# Patient Record
Sex: FEMALE | URBAN - METROPOLITAN AREA
[De-identification: names, ages, dates, MRNs, and addresses within clinical notes are randomized per-mention and may not be internally consistent; named-entity substitution may affect disease eponyms.]

---

## 2019-10-15 ENCOUNTER — APPOINTMENT (OUTPATIENT)
Age: 34
Setting detail: DERMATOLOGY
End: 2019-10-15

## 2019-10-15 DIAGNOSIS — L20.89 OTHER ATOPIC DERMATITIS: ICD-10-CM

## 2019-10-15 PROCEDURE — OTHER PRESCRIPTION MEDICATION MANAGEMENT: OTHER

## 2019-10-15 PROCEDURE — OTHER COUNSELING: OTHER

## 2019-10-15 PROCEDURE — OTHER PRESCRIPTION: OTHER

## 2019-10-15 PROCEDURE — 99202 OFFICE O/P NEW SF 15 MIN: CPT

## 2019-10-15 RX ORDER — MOMETASONE FUROATE 1 MG/G
OINTMENT TOPICAL BID
Qty: 45 | Refills: 1 | Status: ERX | COMMUNITY
Start: 2019-10-15

## 2019-10-15 ASSESSMENT — LOCATION DETAILED DESCRIPTION DERM
LOCATION DETAILED: RIGHT ULNAR DORSAL HAND
LOCATION DETAILED: RIGHT INFERIOR ANTERIOR NECK
LOCATION DETAILED: LEFT RADIAL DORSAL HAND
LOCATION DETAILED: LEFT PROXIMAL PRETIBIAL REGION
LOCATION DETAILED: RIGHT PROXIMAL PRETIBIAL REGION
LOCATION DETAILED: SUPERIOR THORACIC SPINE

## 2019-10-15 ASSESSMENT — LOCATION SIMPLE DESCRIPTION DERM
LOCATION SIMPLE: RIGHT HAND
LOCATION SIMPLE: LEFT HAND
LOCATION SIMPLE: RIGHT ANTERIOR NECK
LOCATION SIMPLE: LEFT PRETIBIAL REGION
LOCATION SIMPLE: RIGHT PRETIBIAL REGION
LOCATION SIMPLE: UPPER BACK

## 2019-10-15 ASSESSMENT — LOCATION ZONE DERM
LOCATION ZONE: HAND
LOCATION ZONE: TRUNK
LOCATION ZONE: LEG
LOCATION ZONE: NECK

## 2019-10-15 NOTE — HPI: RASH
How Severe Is Your Rash?: moderate
Is This A New Presentation, Or A Follow-Up?: Rash
Additional History: Patient says the rash started on her leg a couple months ago and spread to other parts of her body. Patient used Triamcinolone that she had from two years ago. She said it was helpful. The patient said this occurred before two years ago when she was pregnant. Now that she is pregnant again, it has recurred. She said it was diagnosed as some sort of bacterial infection the last time.

## 2019-11-12 ENCOUNTER — APPOINTMENT (OUTPATIENT)
Age: 34
Setting detail: DERMATOLOGY
End: 2019-11-12

## 2019-11-12 DIAGNOSIS — L20.89 OTHER ATOPIC DERMATITIS: ICD-10-CM

## 2019-11-12 PROCEDURE — OTHER PRESCRIPTION: OTHER

## 2019-11-12 PROCEDURE — OTHER COUNSELING: OTHER

## 2019-11-12 PROCEDURE — OTHER PRESCRIPTION MEDICATION MANAGEMENT: OTHER

## 2019-11-12 PROCEDURE — 99213 OFFICE O/P EST LOW 20 MIN: CPT

## 2019-11-12 RX ORDER — CRISABOROLE 20 MG/G
OINTMENT TOPICAL BID
Qty: 60 | Refills: 11 | Status: ERX | COMMUNITY
Start: 2019-11-12

## 2019-11-12 RX ORDER — EMOLLIENT COMBINATION NO.32
EMULSION, EXTENDED RELEASE TOPICAL QD
Qty: 225 | Refills: 11 | Status: ERX | COMMUNITY
Start: 2019-11-12

## 2019-11-12 ASSESSMENT — LOCATION DETAILED DESCRIPTION DERM
LOCATION DETAILED: RIGHT PROXIMAL PRETIBIAL REGION
LOCATION DETAILED: LEFT RADIAL DORSAL HAND
LOCATION DETAILED: SUPERIOR THORACIC SPINE
LOCATION DETAILED: LEFT PROXIMAL PRETIBIAL REGION
LOCATION DETAILED: RIGHT ULNAR DORSAL HAND
LOCATION DETAILED: RIGHT INFERIOR ANTERIOR NECK

## 2019-11-12 ASSESSMENT — LOCATION SIMPLE DESCRIPTION DERM
LOCATION SIMPLE: RIGHT HAND
LOCATION SIMPLE: LEFT PRETIBIAL REGION
LOCATION SIMPLE: LEFT HAND
LOCATION SIMPLE: UPPER BACK
LOCATION SIMPLE: RIGHT PRETIBIAL REGION
LOCATION SIMPLE: RIGHT ANTERIOR NECK

## 2019-11-12 ASSESSMENT — LOCATION ZONE DERM
LOCATION ZONE: NECK
LOCATION ZONE: LEG
LOCATION ZONE: HAND
LOCATION ZONE: TRUNK

## 2019-11-12 NOTE — PROCEDURE: PRESCRIPTION MEDICATION MANAGEMENT
Detail Level: Zone
Continue Regimen: Mometasone for flares
Render In Strict Bullet Format?: No
Initiate Treatment: EpiCeram QD for moisturizer \\nEucrisa for maintenance
Plan: Patient advised to avoid hot showers. If there is still itching after over a week, call office for medication for itching.

## 2019-11-12 NOTE — PROCEDURE: COUNSELING
Patient Specific Counseling (Will Not Stick From Patient To Patient): Doctor counseled patient on weather changes effects on skin
Detail Level: Zone

## 2020-12-15 ENCOUNTER — APPOINTMENT (OUTPATIENT)
Age: 35
Setting detail: DERMATOLOGY
End: 2020-12-15

## 2020-12-15 DIAGNOSIS — L30.1 DYSHIDROSIS [POMPHOLYX]: ICD-10-CM

## 2020-12-15 PROBLEM — D48.5 NEOPLASM OF UNCERTAIN BEHAVIOR OF SKIN: Status: ACTIVE | Noted: 2020-12-15

## 2020-12-15 PROCEDURE — OTHER COUNSELING: OTHER

## 2020-12-15 PROCEDURE — 11102 TANGNTL BX SKIN SINGLE LES: CPT

## 2020-12-15 PROCEDURE — 99213 OFFICE O/P EST LOW 20 MIN: CPT | Mod: 25

## 2020-12-15 PROCEDURE — OTHER ADDITIONAL NOTES: OTHER

## 2020-12-15 PROCEDURE — OTHER BIOPSY BY SHAVE METHOD: OTHER

## 2020-12-15 PROCEDURE — OTHER DIAGNOSIS COMMENT: OTHER

## 2020-12-15 ASSESSMENT — LOCATION DETAILED DESCRIPTION DERM
LOCATION DETAILED: LEFT DORSAL FOOT
LOCATION DETAILED: RIGHT DORSAL FOOT

## 2020-12-15 ASSESSMENT — LOCATION SIMPLE DESCRIPTION DERM
LOCATION SIMPLE: RIGHT FOOT
LOCATION SIMPLE: LEFT FOOT

## 2020-12-15 ASSESSMENT — LOCATION ZONE DERM: LOCATION ZONE: FEET

## 2020-12-15 NOTE — PROCEDURE: BIOPSY BY SHAVE METHOD
Bill 10767 For Specimen Handling/Conveyance To Laboratory?: no
X Size Of Lesion In Cm: 0.4
Hemostasis: Drysol
Consent: Written consent was obtained and risks were reviewed including but not limited to scarring, infection, bleeding, scabbing, incomplete removal, nerve damage and allergy to anesthesia.
Wound Care: Vaseline
Silver Nitrate Text: The wound bed was treated with silver nitrate after the biopsy was performed.
Depth Of Biopsy: dermis
Post-Care Instructions: I reviewed with the patient in detail post-care instructions. Patient is to keep the biopsy site dry overnight, and then apply bacitracin twice daily until healed. Patient may apply hydrogen peroxide soaks to remove any crusting.
Dressing: pressure dressing
Information: Selecting Yes will display possible errors in your note based on the variables you have selected. This validation is only offered as a suggestion for you. PLEASE NOTE THAT THE VALIDATION TEXT WILL BE REMOVED WHEN YOU FINALIZE YOUR NOTE. IF YOU WANT TO FAX A PRELIMINARY NOTE YOU WILL NEED TO TOGGLE THIS TO 'NO' IF YOU DO NOT WANT IT IN YOUR FAXED NOTE.
Was A Bandage Applied: Yes
Notification Instructions: Patient will be notified of biopsy results. However, patient instructed to call the office if not contacted within 2 weeks.
Anesthesia Volume In Cc (Will Not Render If 0): 0.3
Cryotherapy Text: The wound bed was treated with cryotherapy after the biopsy was performed.
Biopsy Method: Dermablade
Type Of Destruction Used: Curettage
Electrodesiccation And Curettage Text: The wound bed was treated with electrodesiccation and curettage after the biopsy was performed.
Billing Type: Third-Party Bill
Detail Level: Detailed
Anesthesia Type: 1% lidocaine with epinephrine and a 1:10 solution of 8.4% sodium bicarbonate
Curettage Text: The wound bed was treated with curettage after the biopsy was performed.
Biopsy Type: H and E
Additional Anesthesia Volume In Cc (Will Not Render If 0): 0
Electrodesiccation Text: The wound bed was treated with electrodesiccation after the biopsy was performed.

## 2020-12-15 NOTE — PROCEDURE: ADDITIONAL NOTES
Additional Notes: Counseled pt to RTC when sxs flare up.
Detail Level: Simple
Additional Notes: We discussed at length the risks and benefits of shave biopsy to delineate underlying nature of lesion . Pt verbalized understanding and would like to proceed with the same at this time.

## 2020-12-15 NOTE — HPI: SKIN LESION
How Severe Is Your Skin Lesion?: moderate
Has Your Skin Lesion Been Treated?: not been treated
Is This A New Presentation, Or A Follow-Up?: Growth
Additional History: Pt suspects a wart.

## 2022-08-18 ENCOUNTER — OFFICE VISIT (OUTPATIENT)
Dept: FAMILY MEDICINE CLINIC | Facility: CLINIC | Age: 37
End: 2022-08-18
Payer: COMMERCIAL

## 2022-08-18 VITALS
HEART RATE: 106 BPM | TEMPERATURE: 99 F | OXYGEN SATURATION: 100 % | BODY MASS INDEX: 20.09 KG/M2 | SYSTOLIC BLOOD PRESSURE: 96 MMHG | RESPIRATION RATE: 16 BRPM | HEIGHT: 66 IN | DIASTOLIC BLOOD PRESSURE: 69 MMHG | WEIGHT: 125 LBS

## 2022-08-18 DIAGNOSIS — J06.9 VIRAL URI WITH COUGH: Primary | ICD-10-CM

## 2022-08-18 PROCEDURE — 3078F DIAST BP <80 MM HG: CPT

## 2022-08-18 PROCEDURE — 3074F SYST BP LT 130 MM HG: CPT

## 2022-08-18 PROCEDURE — 99203 OFFICE O/P NEW LOW 30 MIN: CPT

## 2022-08-18 PROCEDURE — 3008F BODY MASS INDEX DOCD: CPT

## 2022-08-18 RX ORDER — FLUTICASONE PROPIONATE 50 MCG
2 SPRAY, SUSPENSION (ML) NASAL DAILY
Qty: 1 EACH | Refills: 0 | Status: SHIPPED | OUTPATIENT
Start: 2022-08-18 | End: 2022-09-01

## 2023-08-14 ENCOUNTER — APPOINTMENT (OUTPATIENT)
Dept: URBAN - METROPOLITAN AREA CLINIC 248 | Age: 38
Setting detail: DERMATOLOGY
End: 2023-08-14

## 2023-08-14 DIAGNOSIS — L08.9 LOCAL INFECTION OF THE SKIN AND SUBCUTANEOUS TISSUE, UNSPECIFIED: ICD-10-CM

## 2023-08-14 PROCEDURE — OTHER MIPS QUALITY: OTHER

## 2023-08-14 PROCEDURE — OTHER COUNSELING: OTHER

## 2023-08-14 PROCEDURE — OTHER PRESCRIPTION: OTHER

## 2023-08-14 PROCEDURE — OTHER PRESCRIPTION MEDICATION MANAGEMENT: OTHER

## 2023-08-14 PROCEDURE — OTHER ADDITIONAL NOTES: OTHER

## 2023-08-14 PROCEDURE — 99202 OFFICE O/P NEW SF 15 MIN: CPT

## 2023-08-14 RX ORDER — MINOCYCLINE HYDROCHLORIDE 100 MG/1
CAPSULE ORAL BID
Qty: 20 | Refills: 0 | Status: ERX | COMMUNITY
Start: 2023-08-14

## 2023-08-14 RX ORDER — MUPIROCIN 20 MG/G
OINTMENT TOPICAL
Qty: 22 | Refills: 0 | Status: ERX | COMMUNITY
Start: 2023-08-14

## 2023-08-14 ASSESSMENT — LOCATION ZONE DERM: LOCATION ZONE: EAR

## 2023-08-14 ASSESSMENT — LOCATION SIMPLE DESCRIPTION DERM
LOCATION SIMPLE: LEFT EAR
LOCATION SIMPLE: RIGHT EAR

## 2023-08-14 ASSESSMENT — LOCATION DETAILED DESCRIPTION DERM
LOCATION DETAILED: RIGHT POSTERIOR EARLOBE
LOCATION DETAILED: LEFT POSTERIOR EARLOBE

## 2023-08-14 NOTE — PROCEDURE: PRESCRIPTION MEDICATION MANAGEMENT
Render In Strict Bullet Format?: No
Detail Level: Zone
Initiate Treatment: mupirocin 2 % topical ointment\\nSig: Apply twice daily to affected area.\\n\\nminocycline 100 mg capsule BID\\nSig: Take one pill twice daily for 10 days. Take with a full glass of water and a meal.

## 2023-08-17 ENCOUNTER — RX ONLY (RX ONLY)
Age: 38
End: 2023-08-17

## 2023-08-17 RX ORDER — CLOTRIMAZOLE 1 G/100G
CREAM TOPICAL
Qty: 30 | Refills: 1 | Status: ERX | COMMUNITY
Start: 2023-08-17

## 2023-08-26 ENCOUNTER — HOSPITAL ENCOUNTER (EMERGENCY)
Facility: HOSPITAL | Age: 38
Discharge: HOME | End: 2023-08-26
Attending: STUDENT IN AN ORGANIZED HEALTH CARE EDUCATION/TRAINING PROGRAM
Payer: COMMERCIAL

## 2023-08-26 ENCOUNTER — OFFICE VISIT (OUTPATIENT)
Dept: FAMILY MEDICINE CLINIC | Facility: CLINIC | Age: 38
End: 2023-08-26
Payer: COMMERCIAL

## 2023-08-26 VITALS
DIASTOLIC BLOOD PRESSURE: 73 MMHG | TEMPERATURE: 98 F | BODY MASS INDEX: 20.09 KG/M2 | HEART RATE: 89 BPM | WEIGHT: 125 LBS | RESPIRATION RATE: 20 BRPM | SYSTOLIC BLOOD PRESSURE: 108 MMHG | OXYGEN SATURATION: 100 % | HEIGHT: 66 IN

## 2023-08-26 VITALS
OXYGEN SATURATION: 98 % | HEIGHT: 66 IN | BODY MASS INDEX: 20.25 KG/M2 | SYSTOLIC BLOOD PRESSURE: 88 MMHG | TEMPERATURE: 103 F | DIASTOLIC BLOOD PRESSURE: 50 MMHG | WEIGHT: 126 LBS | RESPIRATION RATE: 16 BRPM | HEART RATE: 107 BPM

## 2023-08-26 DIAGNOSIS — R50.9 FEVER, UNSPECIFIED FEVER CAUSE: Primary | ICD-10-CM

## 2023-08-26 DIAGNOSIS — R11.2 NAUSEA AND VOMITING, UNSPECIFIED VOMITING TYPE: ICD-10-CM

## 2023-08-26 DIAGNOSIS — R03.1 LOW BLOOD PRESSURE READING: ICD-10-CM

## 2023-08-26 LAB
FLUAV + FLUBV RNA SPEC NAA+PROBE: NEGATIVE
FLUAV + FLUBV RNA SPEC NAA+PROBE: NEGATIVE
OPERATOR ID: NORMAL
RAPID SARS-COV-2 BY PCR: NOT DETECTED
RSV RNA SPEC NAA+PROBE: NEGATIVE
SARS-COV-2 RNA RESP QL NAA+PROBE: NOT DETECTED

## 2023-08-26 PROCEDURE — 99284 EMERGENCY DEPT VISIT MOD MDM: CPT

## 2023-08-26 PROCEDURE — 0241U SARS-COV-2/FLU A AND B/RSV BY PCR (GENEXPERT): CPT | Performed by: STUDENT IN AN ORGANIZED HEALTH CARE EDUCATION/TRAINING PROGRAM

## 2023-08-26 PROCEDURE — 99283 EMERGENCY DEPT VISIT LOW MDM: CPT

## 2023-08-26 PROCEDURE — 0241U SARS-COV-2/FLU A AND B/RSV BY PCR (GENEXPERT): CPT

## 2023-08-26 RX ORDER — IBUPROFEN 600 MG/1
600 TABLET ORAL ONCE
Status: COMPLETED | OUTPATIENT
Start: 2023-08-26 | End: 2023-08-26

## 2023-08-26 RX ORDER — ONDANSETRON 4 MG/1
4 TABLET, ORALLY DISINTEGRATING ORAL EVERY 4 HOURS PRN
Qty: 10 TABLET | Refills: 0 | Status: SHIPPED | OUTPATIENT
Start: 2023-08-26 | End: 2023-09-02

## 2023-08-26 NOTE — ED INITIAL ASSESSMENT (HPI)
Patient to the ED for eval of fever and low BP. Symptoms started Tuesday with feeling the worst since last noc. Neg for covid PTA.

## 2023-08-26 NOTE — ED QUICK NOTES
Rounding Completed. Received patient from waiting room. Patient is AAOx4. States she's been on Minocycline for the last 8 days for a Staph infection to an infected earring. Patient states her ear feels better so she didn't take the full course of abx. Plan of Care reviewed. Waiting for ED MD to evaluate. Elimination needs assessed. Provided blanket. Bed is locked and in lowest position. Call light within reach.

## 2023-09-05 ENCOUNTER — APPOINTMENT (OUTPATIENT)
Dept: URBAN - METROPOLITAN AREA CLINIC 248 | Age: 38
Setting detail: DERMATOLOGY
End: 2023-09-05

## 2023-09-05 DIAGNOSIS — L08.9 LOCAL INFECTION OF THE SKIN AND SUBCUTANEOUS TISSUE, UNSPECIFIED: ICD-10-CM

## 2023-09-05 PROCEDURE — OTHER MIPS QUALITY: OTHER

## 2023-09-05 PROCEDURE — OTHER COUNSELING: OTHER

## 2023-09-05 PROCEDURE — OTHER PRESCRIPTION MEDICATION MANAGEMENT: OTHER

## 2023-09-05 PROCEDURE — 99212 OFFICE O/P EST SF 10 MIN: CPT

## 2023-09-05 ASSESSMENT — LOCATION DETAILED DESCRIPTION DERM
LOCATION DETAILED: LEFT POSTERIOR EARLOBE
LOCATION DETAILED: RIGHT POSTERIOR EARLOBE

## 2023-09-05 ASSESSMENT — LOCATION ZONE DERM: LOCATION ZONE: EAR

## 2023-09-05 ASSESSMENT — LOCATION SIMPLE DESCRIPTION DERM
LOCATION SIMPLE: LEFT EAR
LOCATION SIMPLE: RIGHT EAR

## 2023-09-05 NOTE — PROCEDURE: PRESCRIPTION MEDICATION MANAGEMENT
Render In Strict Bullet Format?: No
Detail Level: Zone
Discontinue Regimen: minocycline 100 mg capsule BID\\nQuantity: 20.0 Capsule\\nSig: Take one pill twice daily for 10 days. Take with a full glass of water and a meal.
Initiate Treatment: Mupirocin 2 % topical ointment & Clotrimazole topical  Ointment \\nSig: mix both ointments 50/50 and apply twice daily to affected area for 6 weeks\\n\\n\\nBenzoyl Peroxide wash \\nSig: apply to affected ear once to twice a day.

## 2023-10-19 ENCOUNTER — OFFICE VISIT (OUTPATIENT)
Dept: FAMILY MEDICINE CLINIC | Facility: CLINIC | Age: 38
End: 2023-10-19
Payer: COMMERCIAL

## 2023-10-19 VITALS
DIASTOLIC BLOOD PRESSURE: 60 MMHG | SYSTOLIC BLOOD PRESSURE: 100 MMHG | WEIGHT: 129.19 LBS | BODY MASS INDEX: 20.76 KG/M2 | RESPIRATION RATE: 16 BRPM | TEMPERATURE: 98 F | HEART RATE: 82 BPM | HEIGHT: 66 IN

## 2023-10-19 DIAGNOSIS — H92.02 EAR PAIN, LEFT: Primary | ICD-10-CM

## 2023-10-19 PROCEDURE — 3074F SYST BP LT 130 MM HG: CPT | Performed by: FAMILY MEDICINE

## 2023-10-19 PROCEDURE — 99214 OFFICE O/P EST MOD 30 MIN: CPT | Performed by: FAMILY MEDICINE

## 2023-10-19 PROCEDURE — 3078F DIAST BP <80 MM HG: CPT | Performed by: FAMILY MEDICINE

## 2023-10-19 PROCEDURE — 3008F BODY MASS INDEX DOCD: CPT | Performed by: FAMILY MEDICINE

## 2024-02-08 LAB
HEPATITIS B SURFACE ANTIGEN OB: NEGATIVE
HIV RESULT OB: NEGATIVE
RAPID PLASMA REAGIN OB: NONREACTIVE
RH FACTOR OB: POSITIVE

## 2024-03-07 ENCOUNTER — GENETICS ENCOUNTER (OUTPATIENT)
Dept: GENETICS | Facility: HOSPITAL | Age: 39
End: 2024-03-07
Attending: GENETIC COUNSELOR, MS
Payer: COMMERCIAL

## 2024-03-07 ENCOUNTER — APPOINTMENT (OUTPATIENT)
Dept: HEMATOLOGY/ONCOLOGY | Facility: HOSPITAL | Age: 39
End: 2024-03-07
Attending: GENETIC COUNSELOR, MS
Payer: COMMERCIAL

## 2024-03-07 DIAGNOSIS — Z31.5 ENCOUNTER FOR PROCREATIVE GENETIC COUNSELING: Primary | ICD-10-CM

## 2024-03-07 PROCEDURE — 96040 HC GENETIC COUNSELING EA 30 MIN: CPT | Performed by: GENETIC COUNSELOR, MS

## 2024-03-11 NOTE — PROGRESS NOTES
Referring Provider:       Jamey Hanson DO     Additional Providers:   Maico Gomez MD (fax: [phone])     Reason for Referral:  Frank Davis was seen for genetic counseling because of abnormal non-invasive prenatal testing (NIPT).  Ms. Davis is a  1, para 2 woman of  descent who will be 39 years-old on her estimated date of delivery. Her , Augustin, is 39 years-old and of  descent. There is no knowledge of consanguinity.     Social History:  Ms. Davis was seen today with her , Augustin, and two young children. They live in [place].       Pregnancy History:  Ms. Davis is 13 weeks 6 days gestation at the time of this consultation.      Family History:   A three generation pedigree was not obtained today.    Counseling:   The following information was discussed with Ms. Davis and Mr. Brady.     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 Arevalos blood was drawn on 23 for [facility] prenatal screen. This test showed an increased risk for the fetus to have 47, XYY.  Ms. Arevalos NIPT result showed a reduced risk for trisomies 13, 18, and 21, triploidy, and 22q11.2 deletion syndrome.       I explained that non-invasive prenatal tests are screening tests. A normal screening result reduces, but does not exclude, the risk for the fetus to have that condition. Conversely, an abnormal screening result indicates an increased risk for the fetus to have that condition, but is not confirmation that the fetus is actually affected. The likelihood for the fetus to be affected following a positive NIPT result requires knowledge of the sensitivity and specificity of the testing for that condition and the incidence of the condition in the population that is being tested.  Sergei reports a positive predictive value (PPV) for XXX/XXY/XYY of 86.4%. Diagnostic testing is always recommended following an abnormal screening test, even for screening tests with high sensitivity and specificity.        Amniocentesis  The availability of amniocentesis as a diagnostic test to detect fetal chromosome abnormalities was discussed.  Amniocentesis can detect aneuploidies and large chromosome abnormalities with an accuracy of greater than 99%. A fetal diagnosis of 47,XYY would not allow us to predict how this condition would affect the fetus as a child and adult.  There is no treatment available prior to birth.       Structural fetal anomalies are not typically associated with 47, XYY; therefore, ultrasound cannot diagnose or rule out 47,XYY.  I also explained that all pregnancies have a background risk of approximately 3-5% for birth defects or significant medical issues regardless of family history and prenatal testing.       47,XYY  The clinical features of 47, XYY were discussed. The couple was counseled that 47, XYY in general, has a relatively minor effect on the fetus compared to other chromosome abnormalities. Approximately 1 in 1,000 males have an extra “Y” chromosome, with the majority not diagnosed due to the lack of obvious physical signs associated with 47, XYY. This chromosome abnormality is most often associated with an increased risk for behavior and learning problems and tall stature.  Sexual development, fertility, and function is essentially normal in 47, XYY men.     Boys with 47, XYY are reported to be more active, have higher levels of frustration, and have more “temper tantrums” than their peers. In addition, they are more likely to have speech delays and reading difficulties. Males with 47, XYY have normal intelligence but tend have I.Q. scores approximately 10 to 15 points lower than siblings.      Several decades ago it was suggested that males with an extra “Y” chromosome have higher levels of aggression and are more likely to engage in criminal activities.  However, subsequent studies have not supported this conclusion. Parents with a son with 47, XYY should be aware that higher activity  levels and an increased tendency to throw temper tantrums can cause problems for children in school. Therefore, it is important for parents and teachers to work together if behavior problems become a concern.     I encouraged the couple to go to the website for [employer], an organization that provides families with information on several sex chromosome abnormalities including 47, XYY.         Summary and Plan:  Ms. Davis and Mr. Brady was seen for genetic counseling to discuss their positive NIPT results for 47, XYY.  I explained that this is a screening testing and not confirmation that the fetus is affected.  The clinical features of XYY were discussed with the couple. The couple is not planning to pursue amniocentesis. I encouraged Ms. Davis and her  to call me with additional questions.       Approximately 35 minutes was spent in consultation with Ms. Davis and Mr. Brady.

## 2024-07-23 ENCOUNTER — HOSPITAL ENCOUNTER (OUTPATIENT)
Facility: HOSPITAL | Age: 39
Discharge: HOME | End: 2024-07-23
Attending: OBSTETRICS & GYNECOLOGY | Admitting: OBSTETRICS & GYNECOLOGY
Payer: COMMERCIAL

## 2024-07-23 VITALS
BODY MASS INDEX: 24.59 KG/M2 | HEIGHT: 66 IN | RESPIRATION RATE: 18 BRPM | SYSTOLIC BLOOD PRESSURE: 110 MMHG | DIASTOLIC BLOOD PRESSURE: 71 MMHG | HEART RATE: 71 BPM | TEMPERATURE: 99 F | WEIGHT: 153 LBS

## 2024-07-23 PROCEDURE — 59025 FETAL NON-STRESS TEST: CPT

## 2024-07-23 PROCEDURE — 96372 THER/PROPH/DIAG INJ SC/IM: CPT

## 2024-07-23 PROCEDURE — 99213 OFFICE O/P EST LOW 20 MIN: CPT

## 2024-07-23 RX ORDER — BETAMETHASONE SODIUM PHOSPHATE AND BETAMETHASONE ACETATE 3; 3 MG/ML; MG/ML
12 INJECTION, SUSPENSION INTRA-ARTICULAR; INTRALESIONAL; INTRAMUSCULAR; SOFT TISSUE EVERY 24 HOURS
Status: DISCONTINUED | OUTPATIENT
Start: 2024-07-23 | End: 2024-07-23

## 2024-07-23 RX ORDER — LORATADINE 10 MG/1
10 TABLET ORAL DAILY
COMMUNITY

## 2024-07-23 NOTE — PROGRESS NOTES
Pt is a 39 year old female admitted to TRG4/TRG4-A.     Chief Complaint   Patient presents with    R/o  Labor     Pt in office today c/o spotting, found to be 1-2cm. Pt denies cramping, LOF and/or brb. Positive fetal movement.       Pt is  33w3d intra-uterine pregnancy.  History obtained. Oriented to room, staff, and plan of care.

## 2024-07-23 NOTE — PROGRESS NOTES
Pt informed to return for beta #2 tomorrow at 1830.     Written and verbal instructions given. Pt demonstrates understanding. Ambulatory off department in stable condition. Informed to follow-up with physician as scheduled. Denies questions or concerns.

## 2024-07-23 NOTE — DISCHARGE INSTRUCTIONS
Discharge Instructions    Diet: Regular  Activity: Normal activity         General Instructions    Call your OB doctor if: Fluid leaking from your vagina;Decrease in fetal movement;Vaginal or rectal pressure;Uterine contractions 10 minutes or closer for 1 to 2 hours;Vaginal bleeding;Temperature greater than 100F;Uterine contractions increasing in intensity and frequency  Early labor comfort measures: Relax, sleep, take a warm bath or shower for 30 minutes or less;Drink fluids and eat small light meals;Take a walk

## 2024-07-23 NOTE — NST
Physician Evaluation      NST Interpretation: Reactive    Disposition:   monitor 1 hour due to vaginal bleeding. If normal then likely due to cervix irritation from intercourse    Comments:    Will rule out labor and abruption    Bianka Lima MD

## 2024-07-23 NOTE — NST
Nonstress Test   Patient: Frank Davis    Gestation: 33w3d    NST:       Variability: Moderate           Accelerations: Yes           Decelerations: None            Baseline: 135 BPM           Uterine Irritability: No           Contractions: Regular                                        Acoustic Stimulator: No           Nonstress Test Interpretation: Reactive                                 Additional Comments

## 2024-07-24 ENCOUNTER — APPOINTMENT (OUTPATIENT)
Dept: OBGYN CLINIC | Facility: HOSPITAL | Age: 39
End: 2024-07-24
Payer: COMMERCIAL

## 2024-07-24 ENCOUNTER — HOSPITAL ENCOUNTER (OUTPATIENT)
Facility: HOSPITAL | Age: 39
Discharge: HOME | End: 2024-07-24
Attending: OBSTETRICS & GYNECOLOGY | Admitting: OBSTETRICS & GYNECOLOGY
Payer: COMMERCIAL

## 2024-07-24 PROCEDURE — 96372 THER/PROPH/DIAG INJ SC/IM: CPT

## 2024-07-24 RX ORDER — BETAMETHASONE SODIUM PHOSPHATE AND BETAMETHASONE ACETATE 3; 3 MG/ML; MG/ML
12 INJECTION, SUSPENSION INTRA-ARTICULAR; INTRALESIONAL; INTRAMUSCULAR; SOFT TISSUE ONCE
Status: COMPLETED | OUTPATIENT
Start: 2024-07-24 | End: 2024-07-24

## 2024-07-29 LAB
HIV RESULT OB: NEGATIVE
RAPID PLASMA REAGIN OB: NONREACTIVE

## 2024-08-09 LAB — AMB EXT STREP B CULTURE: NEGATIVE

## 2024-08-15 ENCOUNTER — HOSPITAL ENCOUNTER (INPATIENT)
Facility: HOSPITAL | Age: 39
LOS: 2 days | Discharge: HOME | End: 2024-08-17
Attending: EMERGENCY MEDICINE | Admitting: OBSTETRICS & GYNECOLOGY
Payer: COMMERCIAL

## 2024-08-15 DIAGNOSIS — Z37.9: Primary | ICD-10-CM

## 2024-08-15 LAB
ANTIBODY SCREEN: NEGATIVE
BASOPHILS # BLD AUTO: 0.05 X10(3) UL (ref 0–0.2)
BASOPHILS NFR BLD AUTO: 0.5 %
BILIRUB UR QL STRIP.AUTO: NEGATIVE
CLARITY UR REFRACT.AUTO: CLEAR
EOSINOPHIL # BLD AUTO: 0.17 X10(3) UL (ref 0–0.7)
EOSINOPHIL NFR BLD AUTO: 1.7 %
ERYTHROCYTE [DISTWIDTH] IN BLOOD BY AUTOMATED COUNT: 13.2 %
GLUCOSE UR STRIP.AUTO-MCNC: NORMAL MG/DL
HCT VFR BLD AUTO: 36.8 %
HGB BLD-MCNC: 12.7 G/DL
IMM GRANULOCYTES # BLD AUTO: 0.04 X10(3) UL (ref 0–1)
IMM GRANULOCYTES NFR BLD: 0.4 %
KETONES UR STRIP.AUTO-MCNC: NEGATIVE MG/DL
LEUKOCYTE ESTERASE UR QL STRIP.AUTO: NEGATIVE
LYMPHOCYTES # BLD AUTO: 2.48 X10(3) UL (ref 1–4)
LYMPHOCYTES NFR BLD AUTO: 24.3 %
MCH RBC QN AUTO: 29.8 PG (ref 26–34)
MCHC RBC AUTO-ENTMCNC: 34.5 G/DL (ref 31–37)
MCV RBC AUTO: 86.4 FL
MONOCYTES # BLD AUTO: 0.53 X10(3) UL (ref 0.1–1)
MONOCYTES NFR BLD AUTO: 5.2 %
NEUTROPHILS # BLD AUTO: 6.92 X10 (3) UL (ref 1.5–7.7)
NEUTROPHILS # BLD AUTO: 6.92 X10(3) UL (ref 1.5–7.7)
NEUTROPHILS NFR BLD AUTO: 67.9 %
NITRITE UR QL STRIP.AUTO: NEGATIVE
PH UR STRIP.AUTO: 7 [PH] (ref 5–8)
PLATELET # BLD AUTO: 149 10(3)UL (ref 150–450)
PROT UR STRIP.AUTO-MCNC: NEGATIVE MG/DL
RBC # BLD AUTO: 4.26 X10(6)UL
RBC UR QL AUTO: NEGATIVE
RH BLOOD TYPE: POSITIVE
RH BLOOD TYPE: POSITIVE
SP GR UR STRIP.AUTO: 1.01 (ref 1–1.03)
T PALLIDUM AB SER QL IA: NONREACTIVE
UROBILINOGEN UR STRIP.AUTO-MCNC: NORMAL MG/DL
WBC # BLD AUTO: 10.2 X10(3) UL (ref 4–11)

## 2024-08-15 PROCEDURE — 86850 RBC ANTIBODY SCREEN: CPT | Performed by: OBSTETRICS & GYNECOLOGY

## 2024-08-15 PROCEDURE — 86901 BLOOD TYPING SEROLOGIC RH(D): CPT | Performed by: OBSTETRICS & GYNECOLOGY

## 2024-08-15 PROCEDURE — 86900 BLOOD TYPING SEROLOGIC ABO: CPT | Performed by: OBSTETRICS & GYNECOLOGY

## 2024-08-15 PROCEDURE — 88307 TISSUE EXAM BY PATHOLOGIST: CPT | Performed by: OBSTETRICS & GYNECOLOGY

## 2024-08-15 PROCEDURE — 81003 URINALYSIS AUTO W/O SCOPE: CPT | Performed by: OBSTETRICS & GYNECOLOGY

## 2024-08-15 PROCEDURE — 85025 COMPLETE CBC W/AUTO DIFF WBC: CPT | Performed by: OBSTETRICS & GYNECOLOGY

## 2024-08-15 PROCEDURE — 86780 TREPONEMA PALLIDUM: CPT | Performed by: OBSTETRICS & GYNECOLOGY

## 2024-08-15 RX ORDER — BISACODYL 10 MG
10 SUPPOSITORY, RECTAL RECTAL ONCE AS NEEDED
Status: DISCONTINUED | OUTPATIENT
Start: 2024-08-15 | End: 2024-08-17

## 2024-08-15 RX ORDER — SODIUM CHLORIDE, SODIUM LACTATE, POTASSIUM CHLORIDE, CALCIUM CHLORIDE 600; 310; 30; 20 MG/100ML; MG/100ML; MG/100ML; MG/100ML
INJECTION, SOLUTION INTRAVENOUS CONTINUOUS
Status: DISCONTINUED | OUTPATIENT
Start: 2024-08-15 | End: 2024-08-15

## 2024-08-15 RX ORDER — KETOROLAC TROMETHAMINE 30 MG/ML
INJECTION, SOLUTION INTRAMUSCULAR; INTRAVENOUS
Status: COMPLETED
Start: 2024-08-15 | End: 2024-08-15

## 2024-08-15 RX ORDER — IBUPROFEN 600 MG/1
600 TABLET, FILM COATED ORAL ONCE AS NEEDED
Status: DISCONTINUED | OUTPATIENT
Start: 2024-08-15 | End: 2024-08-15

## 2024-08-15 RX ORDER — DEXTROSE, SODIUM CHLORIDE, SODIUM LACTATE, POTASSIUM CHLORIDE, AND CALCIUM CHLORIDE 5; .6; .31; .03; .02 G/100ML; G/100ML; G/100ML; G/100ML; G/100ML
INJECTION, SOLUTION INTRAVENOUS AS NEEDED
Status: DISCONTINUED | OUTPATIENT
Start: 2024-08-15 | End: 2024-08-15

## 2024-08-15 RX ORDER — ONDANSETRON 2 MG/ML
4 INJECTION INTRAMUSCULAR; INTRAVENOUS EVERY 6 HOURS PRN
Status: DISCONTINUED | OUTPATIENT
Start: 2024-08-15 | End: 2024-08-15

## 2024-08-15 RX ORDER — ACETAMINOPHEN 500 MG
500 TABLET ORAL EVERY 6 HOURS PRN
Status: DISCONTINUED | OUTPATIENT
Start: 2024-08-15 | End: 2024-08-15

## 2024-08-15 RX ORDER — ACETAMINOPHEN 500 MG
1000 TABLET ORAL EVERY 6 HOURS PRN
Status: DISCONTINUED | OUTPATIENT
Start: 2024-08-15 | End: 2024-08-15

## 2024-08-15 RX ORDER — ACETAMINOPHEN 500 MG
1000 TABLET ORAL EVERY 6 HOURS PRN
Status: DISCONTINUED | OUTPATIENT
Start: 2024-08-15 | End: 2024-08-17

## 2024-08-15 RX ORDER — SIMETHICONE 80 MG
80 TABLET,CHEWABLE ORAL 3 TIMES DAILY PRN
Status: DISCONTINUED | OUTPATIENT
Start: 2024-08-15 | End: 2024-08-17

## 2024-08-15 RX ORDER — ACETAMINOPHEN 500 MG
500 TABLET ORAL EVERY 6 HOURS PRN
Status: DISCONTINUED | OUTPATIENT
Start: 2024-08-15 | End: 2024-08-17

## 2024-08-15 RX ORDER — IBUPROFEN 600 MG/1
600 TABLET, FILM COATED ORAL EVERY 6 HOURS
Status: DISCONTINUED | OUTPATIENT
Start: 2024-08-15 | End: 2024-08-17

## 2024-08-15 RX ORDER — CITRIC ACID/SODIUM CITRATE 334-500MG
30 SOLUTION, ORAL ORAL AS NEEDED
Status: DISCONTINUED | OUTPATIENT
Start: 2024-08-15 | End: 2024-08-15

## 2024-08-15 RX ORDER — TERBUTALINE SULFATE 1 MG/ML
0.25 INJECTION, SOLUTION SUBCUTANEOUS AS NEEDED
Status: DISCONTINUED | OUTPATIENT
Start: 2024-08-15 | End: 2024-08-15

## 2024-08-15 RX ORDER — DOCUSATE SODIUM 100 MG/1
100 CAPSULE, LIQUID FILLED ORAL
Status: DISCONTINUED | OUTPATIENT
Start: 2024-08-15 | End: 2024-08-17

## 2024-08-15 RX ORDER — KETOROLAC TROMETHAMINE 30 MG/ML
30 INJECTION, SOLUTION INTRAMUSCULAR; INTRAVENOUS EVERY 6 HOURS PRN
Status: DISCONTINUED | OUTPATIENT
Start: 2024-08-15 | End: 2024-08-15

## 2024-08-15 NOTE — PROGRESS NOTES
Patient up to bathroom with assist x 1.  Voided 900ml. Patient transferred to mother/baby room 2197 per wheelchair in stable condition with baby and personal belongings.  Accompanied by significant other and staff.  Report given to mother/baby CHAD Rosario.

## 2024-08-15 NOTE — ED PROVIDER NOTES
Patient Seen in: [facility] Labor & Delivery      History   No chief complaint on file.    Stated Complaint: Single spontaneous delivery (HCC)    Subjective:   HPI    This is a 39-year-old female who is pregnant with her third pregnancy at 36 weeks presents to the emergency room with a spontaneous delivery that happened in the car outside of the hospital today.  The patient was removed from the car and placed on a stretcher and brought back immediately to the emergency room.  The patient complained of some abdominal cramping at this time.  Patient with no other complaints at this time.    Objective:   No pertinent past medical history.            No pertinent past surgical history.              No pertinent social history.            Review of Systems    Positive for stated Chief Complaint: No chief complaint on file.    Other systems are as noted in HPI.  Constitutional and vital signs reviewed.      All other systems reviewed and negative except as noted above.    Physical Exam     ED Triage Vitals [08/15/24 0922]   /77   Pulse 79   Resp 22   Temp    Temp src Temporal   SpO2 100 %   O2 Device None (Room air)       Current Vitals:   Vital Signs  BP: 131/77  Pulse: 79  Resp: 22  Temp src: Temporal    Oxygen Therapy  SpO2: 100 %  O2 Device: None (Room air)            Physical Exam    GENERAL: Well-developed, well-nourished female answering questions appropriately at this time.  ABDOMEN: There is mild focal tenderness to palpation appreciated to the mid abdomen only. There is no guarding, no rebound, no mass, and no organomegaly appreciated.   Genitourinary: The patient has some mild vaginal bleeding appreciated.  Patient has had a spontaneous delivery and has umbilical cord protruding from vagina at this time.  NEURO: Patient is awake, alert and oriented to time place and person. Motor strength is 5 over 5 in all 4 extremities.  Patient answering all questions appropriately.      ED Course   Labs  Reviewed - No data to display                   MDM      Patient's baby had umbilical cord clamped and clipped here in the emergency room and baby was passed Dr. Burns from pediatric emergency room who helped like the baby who was crying here in the emergency room.  Labor and delivery was notified and Dr. Larsen saw the patient in the emergency room.  NICU team came down and resuscitated the baby here at the bedside.  The patient had IV line established given IV fluid and started taken directly to labor and delivery delivery of the placenta.                                   Medical Decision Making      Disposition and Plan     Clinical Impression:  1. Single spontaneous delivery (HCC)         Disposition:  Send to l&d  8/15/2024  9:25 am    Follow-up:  No follow-up provider specified.        Medications Prescribed:  Current Discharge Medication List

## 2024-08-15 NOTE — L&D DELIVERY NOTE
Sudhir Davis [[id number]]      Labor Events     labor?: No   steroids?: None  Rupture date/time: 8/15/2024 0908     Rupture type: SROM  Fluid color: Clear  Labor type: Spontaneous Onset of Labor  Augmentation: None  Intrapartum & labor complications: Precipitous labor       Labor Event Times    Labor onset date/time: 8/15/2024 0820  Dilation complete date/time: 8/15/2024 0900  Start pushing date/time: 8/15/2024 0908       Hull Presentation    Presentation: Vertex       Operative Delivery    Operative Vaginal Delivery: No                Shoulder Dystocia    Shoulder Dystocia: No       Anesthesia    Method: None               Delivery      Delivery date/time:  8/15/24 09:10:00   Delivery type: Normal spontaneous vaginal delivery    Details:     Delivery location: other hospital unit outside of L&D       Delivery Providers    Delivering Clinician: Huong Larsen MD   Delivery personnel:  Provider Role   Desire Caballero, RN Baby Nurse    Delivery Nurse             Cord    Vessels: 3 Vessels  Complications: None  Timed cord clamping: Yes  Cord blood disposition: not collected  Gases sent?: No       Hull Measurements    No data filed       Placenta    Date/time: 8/15/2024 0929  Removal: Spontaneous  Appearance: Intact  Disposition: Pathology       Apgars    Living status: Living   Apgar Scoring Key:    0 1 2    Skin color Blue or pale Acrocyanotic Completely pink    Heart rate Absent <100 bpm >100 bpm    Reflex irritability No response Grimace Cry or active withdrawal    Muscle tone Limp Some flexion Active motion    Respiratory effort Absent Weak cry; hypoventilation Good, crying              1 Minute:  5 Minute:  10 Minute:  15 Minute:  20 Minute:      Skin color:        Heart rate:        Reflex irritablity:        Muscle tone:        Respiratory effort:        Total:            disposition: NICU       Skin to Skin    Reason skin to skin not initiated: Hull Acuity        Vaginal Count    Initial count RN: Daphnie Yin RN  Initial count Tech: Rachael Cruz   Esau   Desis    Initial counts 11   0    Final counts               Lacerations    Episiotomy: None  Perineal lacerations: None      Vaginal laceration?: No      Cervical laceration?: No    Clitoral laceration?: No                                                                        Vaginal Delivery Note          Frank Davis Patient Status:  Inpatient    1985 MRN [id number]   [facility] LABOR & DELIVERY Attending Maico Gomez MD   Hosp Day # 0 PCP Haris Galloway MD       Pre Op Dx:  IUP at 36 weeks    Post Op Dx: Same    Procedure: Normal Spontaneous Vaginal Delivery(precipitous outside of hospital)    Surgeon: Jason    Anesthesia:NONE    EBL: approximately 300cc    Findings: 1) A viable male infant with Apgars of pending and weight pending per stephanie 2) 3 vessel cord. 3)Normal appearing placenta spontaneously delivered by Dr. Larsen. 4)No laceration    Procedure:  Patient is a 38y/o   who presented at 36 5/7 weeks gestation complaining of labor and delivery in car en route to hospital. Patient was admitted to labor and delivery from the ER.  She was seen in ER and placenta was delivered by Dr. Larsen.  Perineum was examined and was intact.  She is in significant pain so will give toradol and observe.  Bleeding is minimal now.    Complications:  None    Maico Gomez MD  8/15/2024  10:00 AM

## 2024-08-15 NOTE — H&P
[facility]  History & Physical    Frank Davis Patient Status:  Inpatient    1985 MRN [id number]   Location [facility] LABOR & DELIVERY Attending Maico Gomez MD   Hosp Day # 0 PCP Haris Galloway MD     SUBJECTIVE:    Frank Davis is a 39 year old G [id number]  female with EDC 24 at 36 and 5/7 weeks gestation who is being admitted for labor management.   She states she began queenie at 9am.  Had SROM at 9:08am and delivered in the ER parking lot at 9:10am.  Dr. Larsen arrived in ER and evaluated patient and delivered placenta.  Victorino was called to eval baby.     Obstetric History:   OB History    Para Term  AB Living   4 2 2   1 2   SAB IAB Ectopic Multiple Live Births     1     2      # Outcome Date GA Lbr Mendez/2nd Weight Sex Type Anes PTL Lv   4 Current            3 Term 2020 38w6d    NORMAL SPONT   MARCIN   2 Term 2018 39w0d    NORMAL SPONT   MARCIN   1 IAB              Past Medical History: No past medical history on file.  Past Social History:   Past Surgical History:   Procedure Laterality Date    Colposcopy, cervix inc upper/adjacent vagina N/A      Family History: No family history on file.  Social History:   Social History     Tobacco Use    Smoking status: Never    Smokeless tobacco: Never   Substance Use Topics    Alcohol use: Never       Home Meds:   Medications Prior to Admission   Medication Sig Dispense Refill Last Dose    loratadine 10 MG Oral Tab Take 1 tablet (10 mg total) by mouth daily.       Prenatal Vit-Fe Sulfate-FA (PRENATAL VITAMIN OR) Take by mouth.       mupirocin 2 % External Ointment Apply 1 Application topically 2 (two) times daily. APPLY TO AFFECTED AREA 30 g 0      Allergies: No Known Allergies    OBJECTIVE:    Pulse:  [79] 79  Resp:  [22] 22  BP: (131)/(77) 131/77  SpO2:  [100 %] 100 %    Lungs:   clear to auscultation bilaterally   Heart:   regular rate and rhythm   Abdomen: Tender to palpation              Perineum  intact          Lab Review:  O, Rh+, Rubella-immune, Hepatitis B surface antigen non-reactive          ASSESSMENT/PLAN:    36 and 5/7 weeks gestation.  2. Precipitous labor - patient in significant pain despite being delivered/placenta delivered and intact perineum.  Appears in significant discomfort and states its in her back.  Will give toradol.  If continues consider imaging.  Will send straight cath.      Risks, benefits, alternatives and possible complications have been discussed in detail with the patient.  All questions answered and all appropriate consents have been signed        Maico Gomez MD  8/15/2024  9:53 AM

## 2024-08-15 NOTE — PROGRESS NOTES
Pt is a 39 year old female admitted to 109/109-A.   No chief complaint on file.     Pt is  36w5d intra-uterine pregnancy.  History obtained, consents signed. Oriented to room, staff, and plan of care.

## 2024-08-15 NOTE — ED QUICK NOTES
Sol told front staff about delivery in car. ED RN called for warmer and delivery bucket along with an OB rapid response. Baby was pelayo, breathing and urinated. Mom got on stretcher at ER entrance, baby was in between moms legs. Baby was bagged on way back and color changing to pink as entering room.     Estimated time of delivery per dad was 0910.

## 2024-08-15 NOTE — ED INITIAL ASSESSMENT (HPI)
Pt arrives to ER after giving birth. Pt was removed from car and onto stretcher and brought back immediately.

## 2024-08-15 NOTE — PLAN OF CARE
Problem: BIRTH - VAGINAL/ SECTION  Goal: Fetal and maternal status remain reassuring during the birth process  Description: INTERVENTIONS:  - Monitor vital signs  - Monitor fetal heart rate  - Monitor uterine activity  - Monitor labor progression (vaginal delivery)  - DVT prophylaxis (C/S delivery)  - Surgical antibiotic prophylaxis (C/S delivery)  Outcome: Completed     Problem: PAIN - ADULT  Goal: Verbalizes/displays adequate comfort level or patient's stated pain goal  Description: INTERVENTIONS:  - Encourage pt to monitor pain and request assistance  - Assess pain using appropriate pain scale  - Administer analgesics based on type and severity of pain and evaluate response  - Implement non-pharmacological measures as appropriate and evaluate response  - Consider cultural and social influences on pain and pain management  - Manage/alleviate anxiety  - Utilize distraction and/or relaxation techniques  - Monitor for opioid side effects  - Notify MD/LIP if interventions unsuccessful or patient reports new pain  - Anticipate increased pain with activity and pre-medicate as appropriate  Outcome: Completed     Problem: ANXIETY  Goal: Will report anxiety at manageable levels  Description: INTERVENTIONS:  - Administer medication as ordered  - Teach and rehearse alternative coping skills  - Provide emotional support with 1:1 interaction with staff  Outcome: Completed     Problem: Patient/Family Goals  Goal: Patient/Family Long Term Goal  Description: Patient's Long Term Goal:     Interventions:  -   - See additional Care Plan goals for specific interventions  Outcome: Completed  Goal: Patient/Family Short Term Goal  Description: Patient's Short Term Goal:     Interventions:   -   - See additional Care Plan goals for specific interventions  Outcome: Completed

## 2024-08-15 NOTE — PROGRESS NOTES
Pt admitted to L&D unit after  in car. EBL not collected by RN due to pt delivery outside of hospital.

## 2024-08-16 LAB
BASOPHILS # BLD AUTO: 0.07 X10(3) UL (ref 0–0.2)
BASOPHILS NFR BLD AUTO: 0.8 %
EOSINOPHIL # BLD AUTO: 0.24 X10(3) UL (ref 0–0.7)
EOSINOPHIL NFR BLD AUTO: 2.8 %
ERYTHROCYTE [DISTWIDTH] IN BLOOD BY AUTOMATED COUNT: 13.1 %
HCT VFR BLD AUTO: 35.4 %
HGB BLD-MCNC: 11.8 G/DL
IMM GRANULOCYTES # BLD AUTO: 0.05 X10(3) UL (ref 0–1)
IMM GRANULOCYTES NFR BLD: 0.6 %
LYMPHOCYTES # BLD AUTO: 2.01 X10(3) UL (ref 1–4)
LYMPHOCYTES NFR BLD AUTO: 23.6 %
MCH RBC QN AUTO: 29.2 PG (ref 26–34)
MCHC RBC AUTO-ENTMCNC: 33.3 G/DL (ref 31–37)
MCV RBC AUTO: 87.6 FL
MONOCYTES # BLD AUTO: 0.46 X10(3) UL (ref 0.1–1)
MONOCYTES NFR BLD AUTO: 5.4 %
NEUTROPHILS # BLD AUTO: 5.69 X10 (3) UL (ref 1.5–7.7)
NEUTROPHILS # BLD AUTO: 5.69 X10(3) UL (ref 1.5–7.7)
NEUTROPHILS NFR BLD AUTO: 66.8 %
PLATELET # BLD AUTO: 145 10(3)UL (ref 150–450)
RBC # BLD AUTO: 4.04 X10(6)UL
WBC # BLD AUTO: 8.5 X10(3) UL (ref 4–11)

## 2024-08-16 PROCEDURE — 85025 COMPLETE CBC W/AUTO DIFF WBC: CPT | Performed by: OBSTETRICS & GYNECOLOGY

## 2024-08-16 NOTE — CM/SW NOTE
met with patient (Frank) to review insurance and PCP for infant. Infant will be added to insurance plan that Frank delivered under, [facility].  reviewed insurance Auth process and discharge planning process. PCP for infant will be George Porter MD. Patient plans on breast feeding and has breast pump. Frank stated that she has crib and car seat for infant.  asked if patient had any concerns related to housing, food, utilities, getting med's, or transportation? Patient stated no concerns at this time.

## 2024-08-16 NOTE — PROGRESS NOTES
PPD #1  Pt without complaints  /76 (BP Location: Right arm)   Pulse 63   Temp 98.1 °F (36.7 °C) (Oral)   Resp 16   Wt 157 lb (71.2 kg)   LMP 10/01/2023 (Exact Date)   SpO2 100%   Breastfeeding Yes   BMI 25.34 kg/m²   Fundus firm at U-2  Lochia - appropriate  WBC - 8.5  Hgb 11.8  A/P Pt doing well - nursing        S/P precipitous delivery in the car - Baby in NICU - desires circ - discussed  with pt that NICU will let us know when it is appropriate to do circ

## 2024-08-17 VITALS
SYSTOLIC BLOOD PRESSURE: 116 MMHG | HEART RATE: 70 BPM | DIASTOLIC BLOOD PRESSURE: 78 MMHG | RESPIRATION RATE: 18 BRPM | TEMPERATURE: 98 F | OXYGEN SATURATION: 100 % | BODY MASS INDEX: 25 KG/M2 | WEIGHT: 157 LBS

## 2024-08-17 RX ORDER — IBUPROFEN 600 MG/1
600 TABLET, FILM COATED ORAL EVERY 6 HOURS PRN
Qty: 30 TABLET | Refills: 0 | Status: SHIPPED | OUTPATIENT
Start: 2024-08-17

## 2024-08-17 NOTE — PLAN OF CARE
Problem: POSTPARTUM  Goal: Long Term Goal:Experiences normal postpartum course  Description: INTERVENTIONS:  - Assess and monitor vital signs and lab values.  - Assess fundus and lochia.  - Provide ice/sitz baths for perineum discomfort.  - Monitor healing of incision/episiotomy/laceration, and assess for signs and symptoms of infection and hematoma.  - Assess bladder function and monitor for bladder distention.  - Provide/instruct/assist with pericare as needed.  - Provide VTE prophylaxis as needed.  - Monitor bowel function.  - Encourage ambulation and provide assistance as needed.  - Assess and monitor emotional status and provide social service/psych resources as needed.  - Utilize standard precautions and use personal protective equipment as indicated. Ensure aseptic care of all intravenous lines and invasive tubes/drains.  - Obtain immunization and exposure to communicable diseases history.  8/17/2024 1000 by uY Howard, RN  Outcome: Completed  8/17/2024 0752 by Yu Howard, RN  Outcome: Progressing  Goal: Optimize infant feeding at the breast  Description: INTERVENTIONS:  - Initiate breast feeding within first hour after birth.   - Monitor effectiveness of current breast feeding efforts.  - Assess support systems available to mother/family.  - Identify cultural beliefs/practices regarding lactation, letdown techniques, maternal food preferences.  - Assess mother's knowledge and previous experience with breast feeding.  - Provide information as needed about early infant feeding cues (e.g., rooting, lip smacking, sucking fingers/hand) versus late cue of crying.  - Discuss/demonstrate breast feeding aids (e.g., infant sling, nursing footstool/pillows, and breast pumps).  - Encourage mother/other family members to express feelings/concerns, and actively listen.  - Educate father/SO about benefits of breast feeding and how to manage common lactation challenges.  - Recommend avoidance of specific  medications or substances incompatible with breast feeding.  - Assess and monitor for signs of nipple pain/trauma.  - Instruct and provide assistance with proper latch.  - Review techniques for milk expression (breast pumping) and storage of breast milk. Provide pumping equipment/supplies, instructions and assistance, as needed.  - Encourage rooming-in and breast feeding on demand.  - Encourage skin-to-skin contact.  - Provide LC support as needed.  - Assess for and manage engorgement.  - Provide breast feeding education handouts and information on community breast feeding support.   2024 1000 by Yu Howard, RN  Outcome: Completed  2024 by Yu Howard, RN  Outcome: Progressing  Goal: Appropriate maternal -  bonding  Description: INTERVENTIONS:  - Assess caregiver- interactions.  - Assess caregiver's emotional status and coping mechanisms.  - Encourage caregiver to participate in  daily care.  - Assess support systems available to mother/family.  - Provide /case management support as needed.  2024 1000 by Yu Howard, RN  Outcome: Completed  2024 by Yu Howard, RN  Outcome: Progressing

## 2024-08-17 NOTE — PROGRESS NOTES
REVIEWED DISCHARGE TEACHING WITH PATIENT. VERBALIZES UNDERSTANDING. PATIENT STATES FEELS CONFIDENT CARING FOR SELF AT HOME. PATIENT WILL FOLLOW UP WITH OB AS DIRECTED.

## 2024-08-17 NOTE — PROGRESS NOTES
OB Progress Note PPD#2    S: Feels well. Ambulating, eating. Pain controlled. Moderate VB. Pumping while baby in NICU.  Voiding without difficulty, + flatus, + BM    O:  Blood pressure 119/81, pulse 74, temperature 98.2 °F (36.8 °C), temperature source Oral, resp. rate 16, weight 157 lb (71.2 kg), last menstrual period 10/01/2023, SpO2 100%, currently breastfeeding.  Gen: NAD, AAOx3  Breasts: soft, nontender, nonerythematous  Abdomen: soft, nontender, nondistended, fundus firm and nontender  Gyne: moderate lochia  Ext: trace edema      Lab Results     Recent Labs   Lab 08/15/24  1040 24  0814   RBC 4.26 4.04   HGB 12.7 11.8*   HCT 36.8 35.4   MCV 86.4 87.6   MCH 29.8 29.2   MCHC 34.5 33.3   RDW 13.2 13.1   NEPRELIM 6.92 5.69   WBC 10.2 8.5   .0* 145.0*           A/P: PPD#2 s/p precipitous , doing well  1. Continue pain control with motrin PRN  2. Breastfeeding   -- given encouragement and support   -- lactation consult PRN  3. Hgb stable at 11.8  4. DVT ppx: ambulating  5. Circumcision desired, while do when okay with NICU    Discharge home today, instructions reviewed    Raquel Jordan D.O.  [facility]  Contact via [employer] or cell

## 2024-08-20 ENCOUNTER — TELEPHONE (OUTPATIENT)
Dept: OBGYN UNIT | Facility: HOSPITAL | Age: 39
End: 2024-08-20

## 2024-08-21 ENCOUNTER — TELEPHONE (OUTPATIENT)
Dept: OBGYN UNIT | Facility: HOSPITAL | Age: 39
End: 2024-08-21

## 2024-08-21 NOTE — PROGRESS NOTES
Cradle call completed. Mom and baby care reviewed. All questions answered. Cradle call letters sent via [employer].

## (undated) NOTE — LETTER
Dear New MomBruno, we missed you! The nurses of [facility] have tried to reach you by phone to ask if you have any questions regarding your health or the health and care of your new little one.    We hope you are doing well. If, for any reason, you have questions or concerns about your health or your baby’s health, please contact your provider or your pediatrician or family medicine physician regarding your baby.     At [facility], we feel that postpartum support is very important for new families. Please see the enclosed new parent support flyer that lists support programs and resources with both in-person and online options.     Additionally, our Breastfeeding Centers at [facility] and [facility] in [place], offer outpatient visits with our International Board-Certified Lactation   Consultants (IBCLCs) for any breastfeeding concerns or questions you may have.    For issues related to stress, anxiety or depression, we have a Nurturing Mom support group that meets both in-person or online.  There’s also a 24-hour Mom’s Line where you can request a phone call from a clinical therapist for assistance for postpartum depression.    We encourage you to take advantage of these programs and resources as you recover from childbirth and learn to care for your new infant.    Best wishes,    [facility]            [phone]